# Patient Record
(demographics unavailable — no encounter records)

---

## 2025-07-24 NOTE — HISTORY OF PRESENT ILLNESS
[FreeTextEntry1] : Skin check. Bump left lower eyelid. With wife.  [de-identified] : No personal or family history of cutaneous malignancy  CEE Doran

## 2025-07-24 NOTE — ASSESSMENT
[FreeTextEntry1] : Alert, oriented, well pleasant.   Sun-exposed cutaneous exam. No evidence of cutaneous malignancy.   Brown, yellow papules and plaques generalized. Seborrheic keratoses. No treatment.   Actinic damage. Reviewed sun protection.  Brown macules and papules less than 0.6cm generalized especially trunk. Nevi. No treatment.  Erythematous papules especially trunk. Angioma. No treatment.  describes protuberant soft asymptomatic flesh colored papule infraorbital left cheek, present almost 1 year. Dried up and fell off ~ 1 month ago. No visible or palpable remaining lesion. Neoplasm Uncertain behavior.  ?fibroepithelial polyp or seborrheic keratosis. No treatment at present. f/u if recurs.  No active seborrheic dermatitis today.  Monitor, report and follow up for changes or symptomatic skin lesions.   Follow up 1 year.